# Patient Record
Sex: MALE | URBAN - METROPOLITAN AREA
[De-identification: names, ages, dates, MRNs, and addresses within clinical notes are randomized per-mention and may not be internally consistent; named-entity substitution may affect disease eponyms.]

---

## 2021-01-26 ENCOUNTER — OFFICE VISIT (OUTPATIENT)
Dept: URBAN - METROPOLITAN AREA CLINIC 88 | Facility: CLINIC | Age: 56
End: 2021-01-26
Payer: MEDICARE

## 2021-01-26 DIAGNOSIS — E11.3593 TYPE 2 DIABETES MELLITUS WITH PROLIFERATIVE DIABETIC RETINOPATHY WITHOUT MACULAR EDEMA, BILATERAL: Primary | ICD-10-CM

## 2021-01-26 DIAGNOSIS — H43.11 VITREOUS HEMORRHAGE, RIGHT EYE: ICD-10-CM

## 2021-01-26 DIAGNOSIS — H33.20 RETINAL DETACHMENT OF EYE: ICD-10-CM

## 2021-01-26 PROCEDURE — 67028 INJECTION EYE DRUG: CPT | Performed by: OPHTHALMOLOGY

## 2021-01-26 PROCEDURE — 92004 COMPRE OPH EXAM NEW PT 1/>: CPT | Performed by: OPHTHALMOLOGY

## 2021-01-26 ASSESSMENT — INTRAOCULAR PRESSURE
OD: 22
OS: 16
OD: 17

## 2021-01-26 ASSESSMENT — KERATOMETRY
OS: 40.50
OD: 41.63

## 2021-01-26 NOTE — IMPRESSION/PLAN
Impression: Presence of intraocular lens: Z96.1. Condition: stable.  Plan: Stable, continue to observe

## 2021-01-26 NOTE — IMPRESSION/PLAN
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 Condition: unstable. Vision: vision affected. Plan: Discussed diagnosis in detail with patient. Emphasized blood sugar control. Keep future appts with PCP. 4+VH OD. Recommend Avastin injection treatment ,risks and benefits were discussed, explained and understood by patient.

## 2021-01-26 NOTE — IMPRESSION/PLAN
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  Plan: see note 1

## 2021-03-02 ENCOUNTER — PROCEDURE (OUTPATIENT)
Dept: URBAN - METROPOLITAN AREA CLINIC 88 | Facility: CLINIC | Age: 56
End: 2021-03-02
Payer: MEDICARE

## 2021-03-02 PROCEDURE — 67028 INJECTION EYE DRUG: CPT | Performed by: OPHTHALMOLOGY

## 2021-03-02 ASSESSMENT — INTRAOCULAR PRESSURE: OD: 28

## 2021-04-22 ENCOUNTER — OFFICE VISIT (OUTPATIENT)
Dept: URBAN - METROPOLITAN AREA CLINIC 88 | Facility: CLINIC | Age: 56
End: 2021-04-22
Payer: MEDICARE

## 2021-04-22 PROCEDURE — 99213 OFFICE O/P EST LOW 20 MIN: CPT | Performed by: OPHTHALMOLOGY

## 2021-04-22 ASSESSMENT — INTRAOCULAR PRESSURE
OD: 17
OS: 16

## 2021-04-22 NOTE — IMPRESSION/PLAN
Impression: Vitreous hemorrhage, right eye: H43.11. Plan: Discussed diagnosis in detail with patient. Vitreous hemorrhage is clearing. Patient was instructed to report any type of visual field changes or visual changes immediately.

## 2021-04-22 NOTE — IMPRESSION/PLAN
Impression: Type 2 diabetes mellitus with proliferative diabetic retinopathy without macular edema, right eye: D01.7742. Condition: unstable. Vision: vision improved. Plan: Discussed diagnosis in detail with patient. Emphasized blood sugar control. Keep future appts with PCP. The patient will undergo Laser treatment. I would recommend PRP Laser treatment ,risks and benefits were discussed, explained and understood by patient.

## 2021-05-04 ENCOUNTER — OFFICE VISIT (OUTPATIENT)
Dept: URBAN - METROPOLITAN AREA CLINIC 88 | Facility: CLINIC | Age: 56
End: 2021-05-04
Payer: MEDICARE

## 2021-05-04 DIAGNOSIS — E11.3591 TYPE 2 DIABETES MELLITUS WITH PROLIFERATIVE DIABETIC RETINOPATHY WITHOUT MACULAR EDEMA, RIGHT EYE: Primary | ICD-10-CM

## 2021-05-04 PROCEDURE — 67228 TREATMENT X10SV RETINOPATHY: CPT | Performed by: OPHTHALMOLOGY

## 2021-07-27 ENCOUNTER — OFFICE VISIT (OUTPATIENT)
Dept: URBAN - METROPOLITAN AREA CLINIC 88 | Facility: CLINIC | Age: 56
End: 2021-07-27
Payer: MEDICARE

## 2021-07-27 DIAGNOSIS — Z96.1 PRESENCE OF INTRAOCULAR LENS: ICD-10-CM

## 2021-07-27 PROCEDURE — 99213 OFFICE O/P EST LOW 20 MIN: CPT | Performed by: OPHTHALMOLOGY

## 2021-07-27 ASSESSMENT — INTRAOCULAR PRESSURE
OD: 16
OS: 15

## 2021-07-27 NOTE — IMPRESSION/PLAN
Impression: Type 2 diabetes mellitus with proliferative diabetic retinopathy without macular edema, right eye: E90.3666. Plan: Discussed diagnosis in detail with patient. Discussed ocular and systemic benefits of blood sugar control. S/P PRP Laser 5/04/2021 OD. Keep future appts. with PCP. No treatment necessary at this time. Patient was instructed to monitor vision for sudden changes and to call if visual changes noted.

## 2021-11-11 ENCOUNTER — OFFICE VISIT (OUTPATIENT)
Dept: URBAN - METROPOLITAN AREA CLINIC 88 | Facility: CLINIC | Age: 56
End: 2021-11-11
Payer: MEDICARE

## 2021-11-11 PROCEDURE — 99213 OFFICE O/P EST LOW 20 MIN: CPT | Performed by: OPHTHALMOLOGY

## 2021-11-11 ASSESSMENT — INTRAOCULAR PRESSURE
OS: 15
OD: 19

## 2021-11-11 NOTE — IMPRESSION/PLAN
Impression: Vitreous hemorrhage, right eye: H43.11. Plan: Discussed diagnosis in detail with patient. Vitreous hemorrhage is slowing resolving. Patient is being referred to a Retina Surgeon. Patient was instructed to report any type of visual field changes or visual changes immediately.

## 2021-11-11 NOTE — IMPRESSION/PLAN
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 Plan: Discussed diagnosis in detail with patient. Emphasized blood sugar control. Keep future appts with PCP. Per patient vision has improved slightly. 3+VH. S/P Avastin 9/13/2021 OD. Patient is being referred to a Retina Surgeon for a Non-clearing Vitreous hemorrhage OD.

## 2022-01-03 ENCOUNTER — OFFICE VISIT (OUTPATIENT)
Dept: URBAN - METROPOLITAN AREA CLINIC 88 | Facility: CLINIC | Age: 57
End: 2022-01-03
Payer: MEDICARE

## 2022-01-03 PROCEDURE — 99213 OFFICE O/P EST LOW 20 MIN: CPT | Performed by: OPHTHALMOLOGY

## 2022-01-03 ASSESSMENT — INTRAOCULAR PRESSURE
OD: 18
OS: 10

## 2022-01-03 NOTE — IMPRESSION/PLAN
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 Plan: Discussed diagnosis in detail with patient. Emphasized blood sugar control. Keep future appts with PCP. Per patient vision has improved slightly. 2+VH. S/P Avastin 9/13/2021 OD. The patient will undergo I-Vit. injections for his Vitreous hemorrhage OD. Recommend Avastin injection treatment ,risks and benefits were discussed, explained and understood by patient.

## 2022-01-24 ENCOUNTER — PROCEDURE (OUTPATIENT)
Dept: URBAN - METROPOLITAN AREA CLINIC 88 | Facility: CLINIC | Age: 57
End: 2022-01-24
Payer: MEDICARE

## 2022-01-24 PROCEDURE — 67028 INJECTION EYE DRUG: CPT | Performed by: OPHTHALMOLOGY

## 2022-01-24 ASSESSMENT — INTRAOCULAR PRESSURE
OS: 11
OD: 17

## 2022-03-31 ENCOUNTER — PROCEDURE (OUTPATIENT)
Dept: URBAN - METROPOLITAN AREA CLINIC 88 | Facility: CLINIC | Age: 57
End: 2022-03-31
Payer: MEDICARE

## 2022-03-31 PROCEDURE — 67028 INJECTION EYE DRUG: CPT | Performed by: OPHTHALMOLOGY

## 2022-03-31 ASSESSMENT — INTRAOCULAR PRESSURE
OS: 11
OD: 31
OD: 17

## 2022-05-26 ENCOUNTER — PROCEDURE (OUTPATIENT)
Dept: URBAN - METROPOLITAN AREA CLINIC 88 | Facility: CLINIC | Age: 57
End: 2022-05-26
Payer: MEDICARE

## 2022-05-26 DIAGNOSIS — H43.11 VITREOUS HEMORRHAGE, RIGHT EYE: ICD-10-CM

## 2022-05-26 DIAGNOSIS — E11.3591 TYPE 2 DIABETES MELLITUS WITH PROLIFERATIVE DIABETIC RETINOPATHY WITHOUT MACULAR EDEMA, RIGHT EYE: Primary | ICD-10-CM

## 2022-05-26 PROCEDURE — 67028 INJECTION EYE DRUG: CPT | Performed by: OPHTHALMOLOGY

## 2022-05-26 PROCEDURE — 99213 OFFICE O/P EST LOW 20 MIN: CPT | Performed by: OPHTHALMOLOGY

## 2022-05-26 ASSESSMENT — INTRAOCULAR PRESSURE
OD: 12
OS: 12
OD: 28

## 2022-05-26 NOTE — IMPRESSION/PLAN
Impression: Type 2 diabetes mellitus with proliferative diabetic retinopathy without macular edema, right eye: U03.9061. Plan: Discussed diagnosis in detail with patient. Emphasized blood sugar control. Keep future appts with PCP. Per patient vision has decreased from previous visit. The patient will undergo I-Vit. injections for his Vitreous hemorrhage OD. Recommend Avastin injection treatment ,risks and benefits were discussed, explained and understood by patient.

## 2022-06-30 ENCOUNTER — OFFICE VISIT (OUTPATIENT)
Dept: URBAN - METROPOLITAN AREA CLINIC 88 | Facility: CLINIC | Age: 57
End: 2022-06-30
Payer: MEDICARE

## 2022-06-30 DIAGNOSIS — H43.11 VITREOUS HEMORRHAGE, RIGHT EYE: ICD-10-CM

## 2022-06-30 DIAGNOSIS — E11.3591 TYPE 2 DIABETES MELLITUS WITH PROLIFERATIVE DIABETIC RETINOPATHY WITHOUT MACULAR EDEMA, RIGHT EYE: Primary | ICD-10-CM

## 2022-06-30 DIAGNOSIS — Z96.1 PRESENCE OF INTRAOCULAR LENS: ICD-10-CM

## 2022-06-30 PROCEDURE — 99213 OFFICE O/P EST LOW 20 MIN: CPT | Performed by: OPHTHALMOLOGY

## 2022-06-30 ASSESSMENT — INTRAOCULAR PRESSURE
OD: 18
OS: 15

## 2022-06-30 NOTE — IMPRESSION/PLAN
Impression: Type 2 diabetes mellitus with proliferative diabetic retinopathy without macular edema, right eye: I50.2690. Plan: Discussed diagnosis in detail with patient. Discussed ocular and systemic benefits of blood sugar control. Keep future appts. with PCP. VH 1+ OD. No trauma from fall. No treatment necessary at this time. Patient was instructed to monitor vision for sudden changes and to call if visual changes noted.

## 2022-08-04 ENCOUNTER — OFFICE VISIT (OUTPATIENT)
Dept: URBAN - METROPOLITAN AREA CLINIC 88 | Facility: CLINIC | Age: 57
End: 2022-08-04
Payer: MEDICARE

## 2022-08-04 DIAGNOSIS — E11.3591 TYPE 2 DIABETES MELLITUS WITH PROLIFERATIVE DIABETIC RETINOPATHY WITHOUT MACULAR EDEMA, RIGHT EYE: Primary | ICD-10-CM

## 2022-08-04 DIAGNOSIS — Z96.1 PRESENCE OF INTRAOCULAR LENS: ICD-10-CM

## 2022-08-04 DIAGNOSIS — H33.20 RETINAL DETACHMENT OF EYE: ICD-10-CM

## 2022-08-04 PROCEDURE — 99213 OFFICE O/P EST LOW 20 MIN: CPT | Performed by: OPHTHALMOLOGY

## 2022-08-04 ASSESSMENT — INTRAOCULAR PRESSURE
OS: 12
OD: 16

## 2022-08-04 NOTE — IMPRESSION/PLAN
Impression: Type 2 diabetes mellitus with proliferative diabetic retinopathy without macular edema, right eye: T00.5579. Plan: Discussed diagnosis in detail with patient. Discussed ocular and systemic benefits of blood sugar control. Vitreous hemorrhage has resolved and VA is stable. Keep future appts. with PCP. No treatment necessary at this time. Patient was instructed to monitor vision for sudden changes and to call if visual changes noted.

## 2022-10-20 ENCOUNTER — OFFICE VISIT (OUTPATIENT)
Dept: URBAN - METROPOLITAN AREA CLINIC 88 | Facility: CLINIC | Age: 57
End: 2022-10-20
Payer: MEDICARE

## 2022-10-20 DIAGNOSIS — H43.11 VITREOUS HEMORRHAGE, RIGHT EYE: ICD-10-CM

## 2022-10-20 DIAGNOSIS — E11.3591 TYPE 2 DIABETES MELLITUS WITH PROLIFERATIVE DIABETIC RETINOPATHY WITHOUT MACULAR EDEMA, RIGHT EYE: Primary | ICD-10-CM

## 2022-10-20 DIAGNOSIS — H33.20 RETINAL DETACHMENT OF EYE: ICD-10-CM

## 2022-10-20 DIAGNOSIS — Z96.1 PRESENCE OF INTRAOCULAR LENS: ICD-10-CM

## 2022-10-20 PROCEDURE — 99213 OFFICE O/P EST LOW 20 MIN: CPT | Performed by: OPHTHALMOLOGY

## 2022-10-20 ASSESSMENT — INTRAOCULAR PRESSURE
OS: 13
OD: 18

## 2022-10-20 NOTE — IMPRESSION/PLAN
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 Plan: Discussed diagnosis in detail with patient. Discussed ocular and systemic benefits of blood sugar control. Keep future appts. with PCP. No treatment necessary at this time. Patient was instructed to monitor vision for sudden changes and to call if visual changes noted.

## 2022-10-20 NOTE — IMPRESSION/PLAN
Impression: Vitreous hemorrhage, right eye: H43.11. Plan: Vitreous hemorrhage is clearing. No Injection today.

## 2023-01-26 ENCOUNTER — OFFICE VISIT (OUTPATIENT)
Dept: URBAN - METROPOLITAN AREA CLINIC 88 | Facility: CLINIC | Age: 58
End: 2023-01-26
Payer: MEDICARE

## 2023-01-26 DIAGNOSIS — H26.492 OTHER SECONDARY CATARACT, LEFT EYE: ICD-10-CM

## 2023-01-26 DIAGNOSIS — Z96.1 PRESENCE OF INTRAOCULAR LENS: ICD-10-CM

## 2023-01-26 DIAGNOSIS — E11.3591 TYPE 2 DIABETES MELLITUS WITH PROLIFERATIVE DIABETIC RETINOPATHY WITHOUT MACULAR EDEMA, RIGHT EYE: Primary | ICD-10-CM

## 2023-01-26 PROCEDURE — 99213 OFFICE O/P EST LOW 20 MIN: CPT | Performed by: OPHTHALMOLOGY

## 2023-01-26 ASSESSMENT — INTRAOCULAR PRESSURE
OD: 16
OS: 14

## 2023-01-26 NOTE — IMPRESSION/PLAN
Impression: Type 2 diabetes mellitus with proliferative diabetic retinopathy without macular edema, right eye: Y26.2310. Plan: Discussed diagnosis in detail with patient. Discussed ocular and systemic benefits of blood sugar control. Keep future appts. with PCP. No treatment necessary at this time. Patient was instructed to monitor vision for sudden changes and to call if visual changes noted.

## 2023-06-08 ENCOUNTER — OFFICE VISIT (OUTPATIENT)
Dept: URBAN - METROPOLITAN AREA CLINIC 88 | Facility: CLINIC | Age: 58
End: 2023-06-08
Payer: MEDICARE

## 2023-06-08 DIAGNOSIS — Z96.1 PRESENCE OF INTRAOCULAR LENS: ICD-10-CM

## 2023-06-08 DIAGNOSIS — E11.3593 TYPE 2 DIABETES MELLITUS WITH PROLIFERATIVE DIABETIC RETINOPATHY WITHOUT MACULAR EDEMA, BILATERAL: Primary | ICD-10-CM

## 2023-06-08 DIAGNOSIS — H33.20 RETINAL DETACHMENT OF EYE: ICD-10-CM

## 2023-06-08 PROCEDURE — 99213 OFFICE O/P EST LOW 20 MIN: CPT | Performed by: OPHTHALMOLOGY

## 2023-06-08 ASSESSMENT — INTRAOCULAR PRESSURE
OD: 15
OS: 15

## 2023-06-08 NOTE — IMPRESSION/PLAN
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 Plan: Discussed findings with patient and no signs of neovascularization noted. Discussed the patient's vision complaints and how complaints are related to the lack of blood sugar control. Will continue to monitor the patient to determine if treatment is necessary.

## 2023-12-26 ENCOUNTER — OFFICE VISIT (OUTPATIENT)
Dept: URBAN - METROPOLITAN AREA CLINIC 88 | Facility: CLINIC | Age: 58
End: 2023-12-26
Payer: MEDICARE

## 2023-12-26 DIAGNOSIS — Z96.1 PRESENCE OF INTRAOCULAR LENS: ICD-10-CM

## 2023-12-26 DIAGNOSIS — H33.20 RETINAL DETACHMENT OF EYE: ICD-10-CM

## 2023-12-26 DIAGNOSIS — E11.3591 TYPE 2 DIABETES MELLITUS WITH PROLIFERATIVE DIABETIC RETINOPATHY WITHOUT MACULAR EDEMA, RIGHT EYE: Primary | ICD-10-CM

## 2023-12-26 PROCEDURE — 99213 OFFICE O/P EST LOW 20 MIN: CPT | Performed by: OPHTHALMOLOGY

## 2023-12-26 ASSESSMENT — VISUAL ACUITY: OD: 20/30

## 2023-12-26 ASSESSMENT — INTRAOCULAR PRESSURE
OS: 13
OD: 15